# Patient Record
Sex: FEMALE | Race: WHITE
[De-identification: names, ages, dates, MRNs, and addresses within clinical notes are randomized per-mention and may not be internally consistent; named-entity substitution may affect disease eponyms.]

---

## 2023-02-24 ENCOUNTER — HOSPITAL ENCOUNTER (OUTPATIENT)
Dept: HOSPITAL 75 - RAD | Age: 48
End: 2023-02-24
Attending: PHYSICIAN ASSISTANT
Payer: COMMERCIAL

## 2023-02-24 DIAGNOSIS — Z12.31: Primary | ICD-10-CM

## 2023-02-24 PROCEDURE — 77067 SCR MAMMO BI INCL CAD: CPT

## 2023-02-24 PROCEDURE — 77063 BREAST TOMOSYNTHESIS BI: CPT

## 2023-02-24 NOTE — DIAGNOSTIC IMAGING REPORT
INDICATION: Routine screening.



No prior mammograms are available for comparison. This is a

baseline study.



2-D and 3-D bilateral screening mammography was performed with

CAD.



Scattered fibroglandular densities are identified bilaterally.

There appear to be intraparenchymal lymph nodes within both

breasts. No spiculated mass or malignant-appearing

microcalcifications are seen. Axillae are unremarkable.



IMPRESSION: BI-RADS Category 2



No mammographic features suspicious for malignancy are

identified.



ACR BI-RADS Category 2: Benign findings.

Result letter will be mailed to the patient.

Note: At least 10% of breast cancer is not imaged by mammography.



Dictated by: 



  Dictated on workstation # [id number]